# Patient Record
Sex: FEMALE | Race: OTHER | HISPANIC OR LATINO | Employment: UNEMPLOYED | URBAN - METROPOLITAN AREA
[De-identification: names, ages, dates, MRNs, and addresses within clinical notes are randomized per-mention and may not be internally consistent; named-entity substitution may affect disease eponyms.]

---

## 2018-08-15 ENCOUNTER — TELEPHONE (OUTPATIENT)
Dept: SURGERY | Facility: HOSPITAL | Age: 38
End: 2018-08-15

## 2018-08-15 RX ORDER — SODIUM CHLORIDE 9 MG/ML
50 INJECTION, SOLUTION INTRAVENOUS CONTINUOUS
Status: CANCELLED | OUTPATIENT
Start: 2018-08-15

## 2018-08-16 ENCOUNTER — HOSPITAL ENCOUNTER (OUTPATIENT)
Dept: INTERVENTIONAL RADIOLOGY/VASCULAR | Facility: HOSPITAL | Age: 38
Discharge: HOME | End: 2018-08-16
Admitting: PHYSICAL MEDICINE & REHABILITATION
Payer: COMMERCIAL

## 2018-08-16 VITALS
DIASTOLIC BLOOD PRESSURE: 62 MMHG | TEMPERATURE: 97.1 F | WEIGHT: 276 LBS | HEART RATE: 80 BPM | BODY MASS INDEX: 41.83 KG/M2 | OXYGEN SATURATION: 97 % | RESPIRATION RATE: 19 BRPM | SYSTOLIC BLOOD PRESSURE: 105 MMHG | HEIGHT: 68 IN

## 2018-08-16 DIAGNOSIS — M54.16 LUMBAR RADICULOPATHY: ICD-10-CM

## 2018-08-16 PROCEDURE — 99152 MOD SED SAME PHYS/QHP 5/>YRS: CPT

## 2018-08-16 PROCEDURE — 99153 MOD SED SAME PHYS/QHP EA: CPT

## 2018-08-16 RX ORDER — PAPAVERINE HCL 150 MG
CAPSULE, EXTENDED RELEASE ORAL CODE/TRAUMA/SEDATION MEDICATION
Status: COMPLETED | OUTPATIENT
Start: 2018-08-16 | End: 2018-08-16

## 2018-08-16 RX ORDER — TRAZODONE HYDROCHLORIDE 100 MG/1
TABLET ORAL
COMMUNITY

## 2018-08-16 RX ORDER — LIDOCAINE HYDROCHLORIDE 10 MG/ML
INJECTION, SOLUTION EPIDURAL; INFILTRATION; INTRACAUDAL; PERINEURAL CODE/TRAUMA/SEDATION MEDICATION
Status: COMPLETED | OUTPATIENT
Start: 2018-08-16 | End: 2018-08-16

## 2018-08-16 RX ORDER — ZOLPIDEM TARTRATE 12.5 MG/1
TABLET, FILM COATED, EXTENDED RELEASE ORAL
COMMUNITY

## 2018-08-16 RX ORDER — FENTANYL CITRATE 50 UG/ML
INJECTION, SOLUTION INTRAMUSCULAR; INTRAVENOUS CODE/TRAUMA/SEDATION MEDICATION
Status: COMPLETED | OUTPATIENT
Start: 2018-08-16 | End: 2018-08-16

## 2018-08-16 RX ORDER — LISINOPRIL 10 MG/1
TABLET ORAL DAILY
COMMUNITY

## 2018-08-16 RX ORDER — OMEPRAZOLE 20 MG/1
CAPSULE, DELAYED RELEASE ORAL DAILY
COMMUNITY

## 2018-08-16 RX ORDER — INSULIN GLARGINE 100 [IU]/ML
INJECTION, SOLUTION SUBCUTANEOUS
COMMUNITY

## 2018-08-16 RX ORDER — SODIUM CHLORIDE 9 MG/ML
50 INJECTION, SOLUTION INTRAVENOUS CONTINUOUS
Status: DISCONTINUED | OUTPATIENT
Start: 2018-08-16 | End: 2018-08-20 | Stop reason: HOSPADM

## 2018-08-16 RX ORDER — ALPRAZOLAM 0.5 MG/1
TABLET ORAL
COMMUNITY

## 2018-08-16 RX ORDER — KETOROLAC TROMETHAMINE 30 MG/ML
INJECTION, SOLUTION INTRAMUSCULAR; INTRAVENOUS CODE/TRAUMA/SEDATION MEDICATION
Status: COMPLETED | OUTPATIENT
Start: 2018-08-16 | End: 2018-08-16

## 2018-08-16 RX ORDER — SIMVASTATIN 10 MG
TABLET ORAL
COMMUNITY

## 2018-08-16 RX ORDER — FERROUS SULFATE 325(65) MG
TABLET ORAL
COMMUNITY

## 2018-08-16 RX ORDER — MIDAZOLAM HYDROCHLORIDE 1 MG/ML
INJECTION INTRAMUSCULAR; INTRAVENOUS CODE/TRAUMA/SEDATION MEDICATION
Status: COMPLETED | OUTPATIENT
Start: 2018-08-16 | End: 2018-08-16

## 2018-08-16 RX ORDER — ALBUTEROL SULFATE 90 UG/1
2 AEROSOL, METERED RESPIRATORY (INHALATION) EVERY 6 HOURS PRN
COMMUNITY

## 2018-08-16 RX ORDER — OXYCODONE HYDROCHLORIDE AND ACETAMINOPHEN 5; 325 MG/1; MG/1
1 TABLET ORAL EVERY 4 HOURS PRN
COMMUNITY

## 2018-08-16 RX ADMIN — DEXAMETHASONE SODIUM PHOSPHATE 10 MG: 10 INJECTION, SOLUTION INTRAMUSCULAR; INTRAVENOUS at 10:57

## 2018-08-16 RX ADMIN — MIDAZOLAM HYDROCHLORIDE 2 MG: 1 INJECTION, SOLUTION INTRAMUSCULAR; INTRAVENOUS at 10:53

## 2018-08-16 RX ADMIN — LIDOCAINE HYDROCHLORIDE 5 ML: 10 INJECTION, SOLUTION EPIDURAL; INFILTRATION; INTRACAUDAL; PERINEURAL at 10:56

## 2018-08-16 RX ADMIN — FENTANYL CITRATE 25 MCG: 50 INJECTION INTRAMUSCULAR; INTRAVENOUS at 10:59

## 2018-08-16 RX ADMIN — IOHEXOL 3 ML: 300 INJECTION, SOLUTION INTRAVENOUS at 10:57

## 2018-08-16 RX ADMIN — SODIUM CHLORIDE 50 ML/HR: 9 INJECTION, SOLUTION INTRAVENOUS at 10:13

## 2018-08-16 RX ADMIN — SODIUM BICARBONATE 1.5 MEQ: 84 INJECTION PARENTERAL at 10:57

## 2018-08-16 RX ADMIN — FENTANYL CITRATE 50 MCG: 50 INJECTION INTRAMUSCULAR; INTRAVENOUS at 10:53

## 2018-08-16 RX ADMIN — KETOROLAC TROMETHAMINE 30 MG: 30 INJECTION, SOLUTION INTRAMUSCULAR; INTRAVENOUS at 10:53

## 2018-08-16 RX ADMIN — MIDAZOLAM HYDROCHLORIDE 2 MG: 1 INJECTION, SOLUTION INTRAMUSCULAR; INTRAVENOUS at 10:58

## 2018-08-16 NOTE — H&P
History of Present Illness: The patient is a 40 y o  female who presents with complaints of  Right lower limb pain, lumbar radiculopathy, bulging disc  There is no problem list on file for this patient        Past Medical History:   Diagnosis Date    Anemia     Anxiety     Asthma     Depression     Diabetes mellitus ([facility] [address] )     Hyperlipidemia     Hypertension        Past Surgical History:   Procedure Laterality Date    HYSTERECTOMY           Current Outpatient Prescriptions:     albuterol (PROVENTIL HFA,VENTOLIN HFA) 90 mcg/act inhaler, Inhale 2 puffs every 6 (six) hours as needed for wheezing, Disp: , Rfl:     ALPRAZolam (XANAX) 0 5 mg tablet, Take by mouth daily at bedtime as needed for anxiety, Disp: , Rfl:     ferrous sulfate 325 (65 Fe) mg tablet, Take by mouth daily with breakfast, Disp: , Rfl:     Fluticasone Furoate-Vilanterol (BREO ELLIPTA IN), Inhale, Disp: , Rfl:     insulin glargine (LANTUS) 100 units/mL subcutaneous injection, Inject under the skin daily at bedtime, Disp: , Rfl:     insulin lispro (HumaLOG) 100 units/mL injection, Inject under the skin 3 (three) times a day before meals, Disp: , Rfl:     lisinopril (ZESTRIL) 10 mg tablet, Take by mouth daily, Disp: , Rfl:     metFORMIN (GLUCOPHAGE) 1000 MG tablet, Take by mouth daily, Disp: , Rfl:     omeprazole (PriLOSEC) 20 mg delayed release capsule, Take by mouth daily, Disp: , Rfl:     oxyCODONE-acetaminophen (PERCOCET) 5-325 mg per tablet, Take 1 tablet by mouth every 4 (four) hours as needed for moderate pain, Disp: , Rfl:     simvastatin (ZOCOR) 10 mg tablet, Take by mouth daily at bedtime, Disp: , Rfl:     sitaGLIPtin (JANUVIA) 100 mg tablet, Take by mouth daily, Disp: , Rfl:     traZODone (DESYREL) 100 mg tablet, Take by mouth daily at bedtime, Disp: , Rfl:     zolpidem (AMBIEN CR) 12 5 MG CR tablet, Take by mouth daily at bedtime as needed for sleep, Disp: , Rfl:     Current Facility-Administered Medications:     sodium chloride 0 9 % infusion, 50 mL/hr, Intravenous, Continuous, Alan Jerome MD, Stopped at 08/16/18 1152    Allergies   Allergen Reactions    Penicillins Rash       Physical Exam:   Vitals:    08/16/18 1139   BP: 105/62   Pulse: 80   Resp: 19   Temp:    SpO2: 97%     General: Awake, Alert, Oriented x 3, Mood and affect appropriate  Respiratory: Respirations even and unlabored  Cardiovascular: Peripheral pulses intact; no edema  Musculoskeletal Exam:   Positive straight leg raise right side, limited lumbar range of motion, tender posterior elements        Assessment:   1   Lumbar radiculopathy        Plan:  Right L5 and S1 transforaminal epidural steroid injections

## 2018-08-16 NOTE — DISCHARGE INSTRUCTIONS
Epidural Steroid Injection   AMBULATORY CARE:   What you need to know about an epidural steroid injection (LISA):  An LISA is a procedure to inject steroid medicine into the epidural space  The epidural space is between your spinal cord and vertebrae  Steroids reduce inflammation and fluid buildup in your spine that may be causing pain  You may be given pain medicine along with the steroids  How to prepare for an LISA:  Your healthcare provider will talk to you about how to prepare for your procedure  He will tell you what medicines to take or not take on the day of your procedure  You may need to stop taking blood thinners or other medicines several days before your procedure  You may need to adjust any diabetes medicine you take on the day of your procedure  Steroid medicine can increase your blood sugar level  What will happen during an LISA:   · You will be given medicine to numb the procedure area  You will be awake for the procedure, but you will not feel pain  You may also be given medicine to help you relax during the procedure  Contrast liquid will be used to help your healthcare provider see the area better  Tell the healthcare provider if you have ever had an allergic reaction to contrast liquid  · Your healthcare provider may place the needle into your neck area, middle of your back, or tailbone area  He may inject the medicine next to the nerves that are causing your pain  He may instead inject the medicine into a larger area of the epidural space  This helps the medicine spread to more nerves  Your healthcare provider will use a fluoroscope to help guide the needle to the right place  A fluoroscope is a type of x-ray  After the procedure, a bandage will be placed over the injection site to prevent infection  Risks of an LISA:  You may have temporary or permanent nerve damage or paralysis   You may have bleeding or develop a serious infection, such as meningitis (swelling of the brain coverings)  An abscess may also develop  You may need surgery to fix the abscess  You may have a seizure, anxiety, or trouble sleeping  If you are a man, you may have temporary erectile dysfunction (not able to have an erection)  Care for your wound as directed: You may remove the bandage before you go to bed the day of your procedure  You may take a shower, but do not take a bath for at least 24 hours  Self-care:   · Do not drive,  use machines, or do strenuous activity for 24 hours after your procedure or as directed  · Continue other treatments  as directed  Steroid injections alone will not control your pain  The injections are meant to be used with other treatments, such as physical therapy  Seek care immediately if:   · Blood soaks through your bandage  · Your wound is red, swollen, or draining pus  · You have a fever or chills, severe back pain, and the procedure area is sensitive to the touch  · You have a seizure  · You have trouble moving your legs  · You have weakness or numbness in your legs  · You cannot control when you urinate or have a bowel movement  Contact your healthcare provider if:   · You have nausea or are vomiting  · Your face or neck is red for a few days and you feel warm  · You have more pain than you had before the procedure  · You have swelling in your hands or feet  · You have questions or concerns about your condition or care  Follow up with your healthcare provider as directed:  Write down your questions so you remember to ask them during your visits  © 2017 [address]  Information is for End User's use only and may not be sold, redistributed or otherwise used for commercial purposes  All illustrations and images included in CareNotes® are the copyrighted property of [employer]  or [employer]  The above information is an  only   It is not intended as medical advice for individual conditions or treatments  Talk to your doctor, nurse or pharmacist before following any medical regimen to see if it is safe and effective for you

## 2018-08-16 NOTE — NURSING NOTE
AVS was given earlier by RN  Understands instructions  Prescription for Percocet given  Declined wheelchair to lobby  Ambulated with family member to lobby  Follow up with MD in office

## 2018-09-05 ENCOUNTER — TELEPHONE (OUTPATIENT)
Dept: SURGERY | Facility: HOSPITAL | Age: 38
End: 2018-09-05

## 2018-09-05 RX ORDER — SODIUM CHLORIDE 9 MG/ML
50 INJECTION, SOLUTION INTRAVENOUS CONTINUOUS
Status: CANCELLED | OUTPATIENT
Start: 2018-09-05

## 2018-09-06 ENCOUNTER — HOSPITAL ENCOUNTER (OUTPATIENT)
Dept: INTERVENTIONAL RADIOLOGY/VASCULAR | Facility: HOSPITAL | Age: 38
Discharge: HOME | End: 2018-09-06
Admitting: PHYSICAL MEDICINE & REHABILITATION
Payer: COMMERCIAL

## 2018-09-06 VITALS
DIASTOLIC BLOOD PRESSURE: 56 MMHG | OXYGEN SATURATION: 99 % | TEMPERATURE: 98.1 F | HEART RATE: 66 BPM | SYSTOLIC BLOOD PRESSURE: 114 MMHG | RESPIRATION RATE: 18 BRPM

## 2018-09-06 DIAGNOSIS — M54.16 LUMBAR RADICULOPATHY: ICD-10-CM

## 2018-09-06 PROCEDURE — 99152 MOD SED SAME PHYS/QHP 5/>YRS: CPT

## 2018-09-06 RX ORDER — MIDAZOLAM HYDROCHLORIDE 1 MG/ML
INJECTION INTRAMUSCULAR; INTRAVENOUS CODE/TRAUMA/SEDATION MEDICATION
Status: COMPLETED | OUTPATIENT
Start: 2018-09-06 | End: 2018-09-06

## 2018-09-06 RX ORDER — LIDOCAINE HYDROCHLORIDE 10 MG/ML
INJECTION, SOLUTION EPIDURAL; INFILTRATION; INTRACAUDAL; PERINEURAL CODE/TRAUMA/SEDATION MEDICATION
Status: COMPLETED | OUTPATIENT
Start: 2018-09-06 | End: 2018-09-06

## 2018-09-06 RX ORDER — FENTANYL CITRATE 50 UG/ML
INJECTION, SOLUTION INTRAMUSCULAR; INTRAVENOUS CODE/TRAUMA/SEDATION MEDICATION
Status: COMPLETED | OUTPATIENT
Start: 2018-09-06 | End: 2018-09-06

## 2018-09-06 RX ORDER — SODIUM CHLORIDE 9 MG/ML
50 INJECTION, SOLUTION INTRAVENOUS CONTINUOUS
Status: DISCONTINUED | OUTPATIENT
Start: 2018-09-06 | End: 2018-09-10 | Stop reason: HOSPADM

## 2018-09-06 RX ORDER — KETOROLAC TROMETHAMINE 30 MG/ML
INJECTION, SOLUTION INTRAMUSCULAR; INTRAVENOUS CODE/TRAUMA/SEDATION MEDICATION
Status: COMPLETED | OUTPATIENT
Start: 2018-09-06 | End: 2018-09-06

## 2018-09-06 RX ADMIN — MIDAZOLAM HYDROCHLORIDE 2 MG: 1 INJECTION, SOLUTION INTRAMUSCULAR; INTRAVENOUS at 11:25

## 2018-09-06 RX ADMIN — KETOROLAC TROMETHAMINE 30 MG: 30 INJECTION, SOLUTION INTRAMUSCULAR; INTRAVENOUS at 11:24

## 2018-09-06 RX ADMIN — LIDOCAINE HYDROCHLORIDE 6 ML: 10 INJECTION, SOLUTION EPIDURAL; INFILTRATION; INTRACAUDAL; PERINEURAL at 11:30

## 2018-09-06 RX ADMIN — SODIUM CHLORIDE 50 ML/HR: 0.9 INJECTION, SOLUTION INTRAVENOUS at 09:51

## 2018-09-06 RX ADMIN — IOHEXOL 3 ML: 300 INJECTION, SOLUTION INTRAVENOUS at 11:30

## 2018-09-06 RX ADMIN — FENTANYL CITRATE 50 MCG: 50 INJECTION INTRAMUSCULAR; INTRAVENOUS at 11:25

## 2018-09-06 RX ADMIN — SODIUM BICARBONATE 25 MEQ: 84 INJECTION PARENTERAL at 11:30

## 2018-09-06 NOTE — DISCHARGE INSTRUCTIONS
Epidural Steroid Injection   AMBULATORY CARE:   What you need to know about an epidural steroid injection (LISA):  An LISA is a procedure to inject steroid medicine into the epidural space  The epidural space is between your spinal cord and vertebrae  Steroids reduce inflammation and fluid buildup in your spine that may be causing pain  You may be given pain medicine along with the steroids  How to prepare for an LISA:  Your healthcare provider will talk to you about how to prepare for your procedure  He will tell you what medicines to take or not take on the day of your procedure  You may need to stop taking blood thinners or other medicines several days before your procedure  You may need to adjust any diabetes medicine you take on the day of your procedure  Steroid medicine can increase your blood sugar level  What will happen during an LISA:   · You will be given medicine to numb the procedure area  You will be awake for the procedure, but you will not feel pain  You may also be given medicine to help you relax during the procedure  Contrast liquid will be used to help your healthcare provider see the area better  Tell the healthcare provider if you have ever had an allergic reaction to contrast liquid  · Your healthcare provider may place the needle into your neck area, middle of your back, or tailbone area  He may inject the medicine next to the nerves that are causing your pain  He may instead inject the medicine into a larger area of the epidural space  This helps the medicine spread to more nerves  Your healthcare provider will use a fluoroscope to help guide the needle to the right place  A fluoroscope is a type of x-ray  After the procedure, a bandage will be placed over the injection site to prevent infection  Risks of an LISA:  You may have temporary or permanent nerve damage or paralysis   You may have bleeding or develop a serious infection, such as meningitis (swelling of the brain coverings)  An abscess may also develop  You may need surgery to fix the abscess  You may have a seizure, anxiety, or trouble sleeping  If you are a man, you may have temporary erectile dysfunction (not able to have an erection)  Care for your wound as directed: You may remove the bandage before you go to bed the day of your procedure  You may take a shower, but do not take a bath for at least 24 hours  Self-care:   · Do not drive,  use machines, or do strenuous activity for 24 hours after your procedure or as directed  · Continue other treatments  as directed  Steroid injections alone will not control your pain  The injections are meant to be used with other treatments, such as physical therapy  Seek care immediately if:   · Blood soaks through your bandage  · Your wound is red, swollen, or draining pus  · You have a fever or chills, severe back pain, and the procedure area is sensitive to the touch  · You have a seizure  · You have trouble moving your legs  · You have weakness or numbness in your legs  · You cannot control when you urinate or have a bowel movement  Contact your healthcare provider if:   · You have nausea or are vomiting  · Your face or neck is red for a few days and you feel warm  · You have more pain than you had before the procedure  · You have swelling in your hands or feet  · You have questions or concerns about your condition or care  Follow up with your healthcare provider as directed:  Write down your questions so you remember to ask them during your visits  © 2017 [address]  Information is for End User's use only and may not be sold, redistributed or otherwise used for commercial purposes  All illustrations and images included in CareNotes® are the copyrighted property of [employer]  or Wilber Hyde  The above information is an  only   It is not intended as medical advice for individual conditions or treatments  Talk to your doctor, nurse or pharmacist before following any medical regimen to see if it is safe and effective for you

## 2018-09-06 NOTE — NURSING NOTE
Returned from cath lab  Alert and oriented  Pain 7/10 in lower back after procedure  Band-aids (2) dry and intact  Moves all extremities  HOB elevated  Respirations easy and non labored  IV fluids continue  Call bell in reach

## 2018-09-06 NOTE — H&P
History of Present Illness: The patient is a 41 y o  female who presents with complaints of right lumbar radiculopathy, lumbar bulging disc    There is no problem list on file for this patient        Past Medical History:   Diagnosis Date    Anemia     Anxiety     Asthma     Depression     Diabetes mellitus ([facility] [address] )     Hyperlipidemia     Hypertension        Past Surgical History:   Procedure Laterality Date    HYSTERECTOMY           Current Outpatient Prescriptions:     albuterol (PROVENTIL HFA,VENTOLIN HFA) 90 mcg/act inhaler, Inhale 2 puffs every 6 (six) hours as needed for wheezing, Disp: , Rfl:     ALPRAZolam (XANAX) 0 5 mg tablet, Take by mouth daily at bedtime as needed for anxiety, Disp: , Rfl:     ferrous sulfate 325 (65 Fe) mg tablet, Take by mouth daily with breakfast, Disp: , Rfl:     Fluticasone Furoate-Vilanterol (BREO ELLIPTA IN), Inhale, Disp: , Rfl:     insulin glargine (LANTUS) 100 units/mL subcutaneous injection, Inject under the skin daily at bedtime, Disp: , Rfl:     insulin lispro (HumaLOG) 100 units/mL injection, Inject under the skin 3 (three) times a day before meals, Disp: , Rfl:     lisinopril (ZESTRIL) 10 mg tablet, Take by mouth daily, Disp: , Rfl:     metFORMIN (GLUCOPHAGE) 1000 MG tablet, Take by mouth daily, Disp: , Rfl:     omeprazole (PriLOSEC) 20 mg delayed release capsule, Take by mouth daily, Disp: , Rfl:     oxyCODONE-acetaminophen (PERCOCET) 5-325 mg per tablet, Take 1 tablet by mouth every 4 (four) hours as needed for moderate pain, Disp: , Rfl:     simvastatin (ZOCOR) 10 mg tablet, Take by mouth daily at bedtime, Disp: , Rfl:     sitaGLIPtin (JANUVIA) 100 mg tablet, Take by mouth daily, Disp: , Rfl:     traZODone (DESYREL) 100 mg tablet, Take by mouth daily at bedtime, Disp: , Rfl:     zolpidem (AMBIEN CR) 12 5 MG CR tablet, Take by mouth daily at bedtime as needed for sleep, Disp: , Rfl:     Current Facility-Administered Medications:     sodium chloride 0 9 % infusion, 50 mL/hr, Intravenous, Continuous, Alan Jerome MD, Last Rate: 50 mL/hr at 09/06/18 0951, 50 mL/hr at 09/06/18 0951    Allergies   Allergen Reactions    Penicillins Rash       Physical Exam:   Vitals:    09/06/18 0908   BP: 116/58   Pulse: 80   Resp: 20   Temp: 98 °F (36 7 °C)   SpO2: 98%     General: Awake, Alert, Oriented x 3, Mood and affect appropriate  Respiratory: Respirations even and unlabored  Cardiovascular: Peripheral pulses intact; no edema  Musculoskeletal Exam:   Positive straight leg raise right side only limited lumbar range of motion        Assessment:   1   Lumbar radiculopathy        Plan:   Right L5 and S1 transforaminal epidural steroid injections

## 2018-10-24 ENCOUNTER — TELEPHONE (OUTPATIENT)
Dept: SURGERY | Facility: HOSPITAL | Age: 38
End: 2018-10-24

## 2018-11-07 ENCOUNTER — TELEPHONE (OUTPATIENT)
Dept: SURGERY | Facility: HOSPITAL | Age: 38
End: 2018-11-07

## 2018-11-08 RX ORDER — SODIUM CHLORIDE 9 MG/ML
50 INJECTION, SOLUTION INTRAVENOUS CONTINUOUS
Status: CANCELLED | OUTPATIENT
Start: 2018-11-08

## 2024-02-08 ENCOUNTER — HOSPITAL ENCOUNTER (EMERGENCY)
Facility: HOSPITAL | Age: 44
Discharge: HOME | End: 2024-02-08
Attending: EMERGENCY MEDICINE | Admitting: EMERGENCY MEDICINE
Payer: COMMERCIAL

## 2024-02-08 VITALS
WEIGHT: 180 LBS | BODY MASS INDEX: 27.37 KG/M2 | SYSTOLIC BLOOD PRESSURE: 127 MMHG | DIASTOLIC BLOOD PRESSURE: 82 MMHG | TEMPERATURE: 98.6 F | OXYGEN SATURATION: 100 % | HEART RATE: 85 BPM | RESPIRATION RATE: 16 BRPM

## 2024-02-08 DIAGNOSIS — K08.89 DENTALGIA: Primary | ICD-10-CM

## 2024-02-08 PROCEDURE — 99284 EMERGENCY DEPT VISIT MOD MDM: CPT | Performed by: EMERGENCY MEDICINE

## 2024-02-08 PROCEDURE — 99282 EMERGENCY DEPT VISIT SF MDM: CPT

## 2024-02-08 RX ORDER — LISINOPRIL AND HYDROCHLOROTHIAZIDE 25; 20 MG/1; MG/1
TABLET ORAL
COMMUNITY

## 2024-02-08 RX ORDER — METRONIDAZOLE 500 MG/1
TABLET ORAL
COMMUNITY

## 2024-02-08 RX ORDER — MEDROXYPROGESTERONE ACETATE 10 MG/1
TABLET ORAL
COMMUNITY

## 2024-02-08 RX ORDER — TRAMADOL HYDROCHLORIDE 50 MG/1
TABLET ORAL
COMMUNITY
End: 2024-02-08 | Stop reason: ALTCHOICE

## 2024-02-08 RX ORDER — TRAMADOL HYDROCHLORIDE 50 MG/1
50 TABLET ORAL EVERY 6 HOURS PRN
Qty: 16 TABLET | Refills: 0 | Status: SHIPPED | OUTPATIENT
Start: 2024-02-08 | End: 2024-02-18

## 2024-02-10 NOTE — ED PROVIDER NOTES
History  Chief Complaint   Patient presents with    Dental Pain     Patient reports dental pain, molars need to be removed. Unrelieved pain with tylenol.      Patient is a 43-year-old female who presents with an exacerbation of chronic dental pain.  Has seen a dentist, told she needs an oral surgeon.  No fever.  No difficulty swallowing or breathing.  No relief with otc meds.  No fever. Pain is b/l sides of mouth, upper and lower.  No radiation.         Prior to Admission Medications   Prescriptions Last Dose Informant Patient Reported? Taking?   ALPRAZolam (XANAX) 0.5 mg tablet  Self Yes No   Sig: Take by mouth daily at bedtime as needed for anxiety   Fluticasone Furoate-Vilanterol (BREO ELLIPTA IN)  Self Yes No   Sig: Inhale   albuterol (PROVENTIL HFA,VENTOLIN HFA) 90 mcg/act inhaler  Self Yes No   Sig: Inhale 2 puffs every 6 (six) hours as needed for wheezing   ferrous sulfate 325 (65 Fe) mg tablet  Self Yes No   Sig: Take by mouth daily with breakfast   insulin glargine (LANTUS) 100 units/mL subcutaneous injection  Self Yes No   Sig: Inject under the skin daily at bedtime   insulin lispro (HumaLOG) 100 units/mL injection  Self Yes No   Sig: Inject under the skin 3 (three) times a day before meals   lisinopril (ZESTRIL) 10 mg tablet  Self Yes No   Sig: Take by mouth daily   lisinopril-hydrochlorothiazide (PRINZIDE,ZESTORETIC) 20-25 MG per tablet   Yes No   medroxyPROGESTERone (PROVERA) 10 mg tablet   Yes No   metFORMIN (GLUCOPHAGE) 1000 MG tablet  Self Yes No   Sig: Take by mouth daily   metroNIDAZOLE (FLAGYL) 500 mg tablet   Yes No   nystatin-triamcinolone (MYCOLOG-II) cream   Yes No   omeprazole (PriLOSEC) 20 mg delayed release capsule  Self Yes No   Sig: Take by mouth daily   simvastatin (ZOCOR) 10 mg tablet   Yes No   Sig: Take by mouth daily at bedtime   sitaGLIPtin (JANUVIA) 100 mg tablet  Self Yes No   Sig: Take by mouth daily   traZODone (DESYREL) 100 mg tablet  Self Yes No   Sig: Take by mouth daily at  bedtime   zolpidem (AMBIEN CR) 12.5 MG CR tablet  Self Yes No   Sig: Take by mouth daily at bedtime as needed for sleep      Facility-Administered Medications: None       Past Medical History:   Diagnosis Date    Anemia     Anxiety     Asthma     Depression     Diabetes mellitus (HCC)     Hyperlipidemia     Hypertension        Past Surgical History:   Procedure Laterality Date    HYSTERECTOMY         Family History   Problem Relation Age of Onset    Hypertension Mother     Asthma Mother     Diabetes Father     Asthma Father      I have reviewed and agree with the history as documented.    E-Cigarette/Vaping    E-Cigarette Use Never User      E-Cigarette/Vaping Substances     Social History     Tobacco Use    Smoking status: Never    Smokeless tobacco: Never   Vaping Use    Vaping status: Never Used   Substance Use Topics    Alcohol use: No    Drug use: No       Review of Systems   Constitutional: Negative.    HENT:  Positive for dental problem.    Eyes: Negative.    Respiratory: Negative.     Cardiovascular: Negative.    Gastrointestinal: Negative.    Endocrine: Negative.    Genitourinary: Negative.    Musculoskeletal: Negative.    Skin: Negative.    Allergic/Immunologic: Negative.    Neurological: Negative.    Hematological: Negative.    Psychiatric/Behavioral: Negative.     All other systems reviewed and are negative.      Physical Exam  Physical Exam  Vitals and nursing note reviewed.   Constitutional:       Appearance: Normal appearance. She is normal weight.   HENT:      Head: Normocephalic and atraumatic.      Right Ear: Tympanic membrane, ear canal and external ear normal.      Left Ear: Tympanic membrane, ear canal and external ear normal.      Nose: Nose normal.      Mouth/Throat:      Mouth: Mucous membranes are moist.      Pharynx: Oropharynx is clear.      Comments: Multiple missing teeth, more on top row than bottom.  Remaining teeth in various stages of decay.    No abscess, no trismus, no fluctuance.   No tongue protrusion or brawny discoloration under the chin.  Cardiovascular:      Rate and Rhythm: Normal rate and regular rhythm.      Pulses: Normal pulses.      Heart sounds: Normal heart sounds.   Pulmonary:      Effort: Pulmonary effort is normal.      Breath sounds: Normal breath sounds.   Musculoskeletal:      Cervical back: Normal range of motion.   Skin:     General: Skin is warm and dry.   Neurological:      General: No focal deficit present.      Mental Status: She is alert and oriented to person, place, and time.         Vital Signs  ED Triage Vitals [02/08/24 1629]   Temperature Pulse Respirations Blood Pressure SpO2   98.6 °F (37 °C) 85 16 127/82 100 %      Temp Source Heart Rate Source Patient Position - Orthostatic VS BP Location FiO2 (%)   Tympanic Monitor Sitting Left arm --      Pain Score       --           Vitals:    02/08/24 1629   BP: 127/82   Pulse: 85   Patient Position - Orthostatic VS: Sitting         Visual Acuity      ED Medications  Medications - No data to display    Diagnostic Studies  Results Reviewed       None                   No orders to display              Procedures  Procedures         ED Course                               SBIRT 22yo+      Flowsheet Row Most Recent Value   Initial Alcohol Screen: US AUDIT-C     1. How often do you have a drink containing alcohol? 0 Filed at: 02/08/2024 1631   2. How many drinks containing alcohol do you have on a typical day you are drinking?  0 Filed at: 02/08/2024 1631   3a. Male UNDER 65: How often do you have five or more drinks on one occasion? 0 Filed at: 02/08/2024 1631   3b. FEMALE Any Age, or MALE 65+: How often do you have 4 or more drinks on one occassion? 0 Filed at: 02/08/2024 1631   Audit-C Score 0 Filed at: 02/08/2024 1631   PAMELA: How many times in the past year have you...    Used an illegal drug or used a prescription medication for non-medical reasons? Never Filed at: 02/08/2024 1631                      Medical Decision  Making  Problems Addressed:  Dentalgia: chronic illness or injury     Details: No airway issues.  No acute infection.  Needs oral surgeon.     Risk  Prescription drug management.             Disposition  Final diagnoses:   Dentalgia     Time reflects when diagnosis was documented in both MDM as applicable and the Disposition within this note       Time User Action Codes Description Comment    2/8/2024  4:33 PM Naeem Aviles Add [K08.89] Dentalgia           ED Disposition       ED Disposition   Discharge    Condition   Stable    Date/Time   Thu Feb 8, 2024 1633    Comment   Pema Kraus discharge to home/self care.                   Follow-up Information       Follow up With Specialties Details Why Contact Info    [facility] [place] [address]  [phone]            Discharge Medication List as of 2/8/2024  4:35 PM        START taking these medications    Details   traMADol (Ultram) 50 mg tablet Take 1 tablet (50 mg total) by mouth every 6 (six) hours as needed for moderate pain for up to 10 days, Starting Thu 2/8/2024, Until Sun 2/18/2024 at 2359, Normal           CONTINUE these medications which have NOT CHANGED    Details   albuterol (PROVENTIL HFA,VENTOLIN HFA) 90 mcg/act inhaler Inhale 2 puffs every 6 (six) hours as needed for wheezing, Historical Med      ALPRAZolam (XANAX) 0.5 mg tablet Take by mouth daily at bedtime as needed for anxiety, Historical Med      ferrous sulfate 325 (65 Fe) mg tablet Take by mouth daily with breakfast, Historical Med      Fluticasone Furoate-Vilanterol (BREO ELLIPTA IN) Inhale, Historical Med      insulin glargine (LANTUS) 100 units/mL subcutaneous injection Inject under the skin daily at bedtime, Historical Med      insulin lispro (HumaLOG) 100 units/mL injection Inject under the skin 3 (three) times a day before meals, Historical Med      lisinopril (ZESTRIL) 10 mg tablet Take by mouth  daily, Historical Med      lisinopril-hydrochlorothiazide (PRINZIDE,ZESTORETIC) 20-25 MG per tablet Historical Med      medroxyPROGESTERone (PROVERA) 10 mg tablet Historical Med      metFORMIN (GLUCOPHAGE) 1000 MG tablet Take by mouth daily, Historical Med      metroNIDAZOLE (FLAGYL) 500 mg tablet Historical Med      nystatin-triamcinolone (MYCOLOG-II) cream Historical Med      omeprazole (PriLOSEC) 20 mg delayed release capsule Take by mouth daily, Historical Med      simvastatin (ZOCOR) 10 mg tablet Take by mouth daily at bedtime, Historical Med      sitaGLIPtin (JANUVIA) 100 mg tablet Take by mouth daily, Historical Med      traZODone (DESYREL) 100 mg tablet Take by mouth daily at bedtime, Historical Med      zolpidem (AMBIEN CR) 12.5 MG CR tablet Take by mouth daily at bedtime as needed for sleep, Historical Med             No discharge procedures on file.    PDMP Review       None            ED Provider  Electronically Signed by             Naeem Aviles MD  02/10/24 9649

## 2024-04-07 ENCOUNTER — HOSPITAL ENCOUNTER (EMERGENCY)
Facility: HOSPITAL | Age: 44
Discharge: HOME | End: 2024-04-07
Attending: EMERGENCY MEDICINE

## 2024-04-07 VITALS
OXYGEN SATURATION: 99 % | WEIGHT: 173.9 LBS | DIASTOLIC BLOOD PRESSURE: 68 MMHG | HEART RATE: 71 BPM | TEMPERATURE: 98 F | SYSTOLIC BLOOD PRESSURE: 120 MMHG | RESPIRATION RATE: 16 BRPM | BODY MASS INDEX: 26.44 KG/M2

## 2024-04-07 DIAGNOSIS — K08.89 PAIN, DENTAL: Primary | ICD-10-CM

## 2024-04-07 PROCEDURE — 99282 EMERGENCY DEPT VISIT SF MDM: CPT

## 2024-04-07 PROCEDURE — 99283 EMERGENCY DEPT VISIT LOW MDM: CPT

## 2024-04-07 NOTE — ED PROVIDER NOTES
History  Chief Complaint   Patient presents with    Dental Pain     R lower     Patient is a 43-year-old female coming in for left lower dental pain.  Has been ongoing for approximately 2 weeks.  Reports she has a history of gastric bypass, and cannot take Motrin.  States that Tylenol does not work for her.  Patient has not yet called her dentist.      Dental Pain      Prior to Admission Medications   Prescriptions Last Dose Informant Patient Reported? Taking?   ALPRAZolam (XANAX) 0.5 mg tablet  Self Yes No   Sig: Take by mouth daily at bedtime as needed for anxiety   Fluticasone Furoate-Vilanterol (BREO ELLIPTA IN)  Self Yes No   Sig: Inhale   albuterol (PROVENTIL HFA,VENTOLIN HFA) 90 mcg/act inhaler  Self Yes No   Sig: Inhale 2 puffs every 6 (six) hours as needed for wheezing   ferrous sulfate 325 (65 Fe) mg tablet  Self Yes No   Sig: Take by mouth daily with breakfast   insulin glargine (LANTUS) 100 units/mL subcutaneous injection  Self Yes No   Sig: Inject under the skin daily at bedtime   insulin lispro (HumaLOG) 100 units/mL injection  Self Yes No   Sig: Inject under the skin 3 (three) times a day before meals   lisinopril (ZESTRIL) 10 mg tablet  Self Yes No   Sig: Take by mouth daily   lisinopril-hydrochlorothiazide (PRINZIDE,ZESTORETIC) 20-25 MG per tablet   Yes No   medroxyPROGESTERone (PROVERA) 10 mg tablet   Yes No   metFORMIN (GLUCOPHAGE) 1000 MG tablet  Self Yes No   Sig: Take by mouth daily   metroNIDAZOLE (FLAGYL) 500 mg tablet   Yes No   nystatin-triamcinolone (MYCOLOG-II) cream   Yes No   omeprazole (PriLOSEC) 20 mg delayed release capsule  Self Yes No   Sig: Take by mouth daily   simvastatin (ZOCOR) 10 mg tablet   Yes No   Sig: Take by mouth daily at bedtime   sitaGLIPtin (JANUVIA) 100 mg tablet  Self Yes No   Sig: Take by mouth daily   traZODone (DESYREL) 100 mg tablet  Self Yes No   Sig: Take by mouth daily at bedtime   zolpidem (AMBIEN CR) 12.5 MG CR tablet  Self Yes No   Sig: Take by mouth  daily at bedtime as needed for sleep      Facility-Administered Medications: None       Past Medical History:   Diagnosis Date    Anemia     Anxiety     Asthma     Depression     Diabetes mellitus (HCC)     Hyperlipidemia     Hypertension        Past Surgical History:   Procedure Laterality Date    HYSTERECTOMY         Family History   Problem Relation Age of Onset    Hypertension Mother     Asthma Mother     Diabetes Father     Asthma Father      I have reviewed and agree with the history as documented.    E-Cigarette/Vaping    E-Cigarette Use Never User      E-Cigarette/Vaping Substances     Social History     Tobacco Use    Smoking status: Never    Smokeless tobacco: Never   Vaping Use    Vaping status: Never Used   Substance Use Topics    Alcohol use: No    Drug use: No       Review of Systems   HENT:  Positive for dental problem.        Physical Exam  Physical Exam  Vitals reviewed.   Constitutional:       Appearance: Normal appearance. She is normal weight.   HENT:      Head: Normocephalic and atraumatic.      Comments: Patient has poor dentition.  No erythema, swelling, no abscess.  Speaking full sentences, no trismus     Right Ear: External ear normal.      Left Ear: External ear normal.      Nose: Nose normal.   Eyes:      Conjunctiva/sclera: Conjunctivae normal.   Cardiovascular:      Rate and Rhythm: Normal rate.   Pulmonary:      Effort: Pulmonary effort is normal.   Musculoskeletal:         General: Normal range of motion.      Cervical back: Normal range of motion.   Skin:     General: Skin is warm and dry.   Neurological:      Mental Status: She is alert.         Vital Signs  ED Triage Vitals [04/07/24 1743]   Temperature Pulse Respirations Blood Pressure SpO2   98 °F (36.7 °C) 71 16 120/68 99 %      Temp Source Heart Rate Source Patient Position - Orthostatic VS BP Location FiO2 (%)   Tympanic Monitor Sitting Left arm --      Pain Score       --           Vitals:    04/07/24 1743   BP: 120/68    Pulse: 71   Patient Position - Orthostatic VS: Sitting         Visual Acuity      ED Medications  Medications - No data to display    Diagnostic Studies  Results Reviewed       None                   No orders to display              Procedures  Procedures         ED Course                               SBIRT 20yo+      Flowsheet Row Most Recent Value   Initial Alcohol Screen: US AUDIT-C     1. How often do you have a drink containing alcohol? 0 Filed at: 04/07/2024 1743   2. How many drinks containing alcohol do you have on a typical day you are drinking?  0 Filed at: 04/07/2024 1743   3a. Male UNDER 65: How often do you have five or more drinks on one occasion? 0 Filed at: 04/07/2024 1743   3b. FEMALE Any Age, or MALE 65+: How often do you have 4 or more drinks on one occassion? 0 Filed at: 04/07/2024 1743   Audit-C Score 0 Filed at: 04/07/2024 1743   PAMELA: How many times in the past year have you...    Used an illegal drug or used a prescription medication for non-medical reasons? Never Filed at: 04/07/2024 1743                      Medical Decision Making  Patient comes in for dental pain.  Patient has no sign of infection at this time, but does have poor dentition.  Given information for local dental clinic.  Discussed with patient that I would not be prescribing her tramadol as requested, but rather that she needs to go get her teeth repaired by dentistry.  Recommended that she take Tylenol, and not take Motrin due to her gastric bypass history             Disposition  Final diagnoses:   Pain, dental     Time reflects when diagnosis was documented in both MDM as applicable and the Disposition within this note       Time User Action Codes Description Comment    4/7/2024  5:59 PM Chacorta Morrison Add [K08.89] Pain, dental           ED Disposition       ED Disposition   Discharge    Condition   Stable    Date/Time   Sun Apr 7, 2024 2639    Comment   Pema Kraus discharge to home/self care.                    Follow-up Information       Follow up With Specialties Details Why Contact Info Additional Information    Brenton Villa MD Internal Medicine   [address]  [address]  [facility] [address]  [phone]       [facility] Emergency Department Emergency Medicine  As needed, If symptoms worsen [address] [facility] [address] [phone] [facility] Emergency Department    [facility]  Schedule an appointment as soon as possible for a visit in 1 day For dental follow up [address] [facility] [id number] [phone]             Discharge Medication List as of 4/7/2024  5:59 PM        CONTINUE these medications which have NOT CHANGED    Details   albuterol (PROVENTIL HFA,VENTOLIN HFA) 90 mcg/act inhaler Inhale 2 puffs every 6 (six) hours as needed for wheezing, Historical Med      ALPRAZolam (XANAX) 0.5 mg tablet Take by mouth daily at bedtime as needed for anxiety, Historical Med      ferrous sulfate 325 (65 Fe) mg tablet Take by mouth daily with breakfast, Historical Med      Fluticasone Furoate-Vilanterol (BREO ELLIPTA IN) Inhale, Historical Med      insulin glargine (LANTUS) 100 units/mL subcutaneous injection Inject under the skin daily at bedtime, Historical Med      insulin lispro (HumaLOG) 100 units/mL injection Inject under the skin 3 (three) times a day before meals, Historical Med      lisinopril (ZESTRIL) 10 mg tablet Take by mouth daily, Historical Med      lisinopril-hydrochlorothiazide (PRINZIDE,ZESTORETIC) 20-25 MG per tablet Historical Med      medroxyPROGESTERone (PROVERA) 10 mg tablet Historical Med      metFORMIN (GLUCOPHAGE) 1000 MG tablet Take by mouth daily, Historical Med      metroNIDAZOLE (FLAGYL) 500 mg tablet Historical Med      nystatin-triamcinolone (MYCOLOG-II) cream Historical Med      omeprazole (PriLOSEC) 20 mg delayed release capsule Take by mouth daily, Historical Med      simvastatin  (ZOCOR) 10 mg tablet Take by mouth daily at bedtime, Historical Med      sitaGLIPtin (JANUVIA) 100 mg tablet Take by mouth daily, Historical Med      traZODone (DESYREL) 100 mg tablet Take by mouth daily at bedtime, Historical Med      zolpidem (AMBIEN CR) 12.5 MG CR tablet Take by mouth daily at bedtime as needed for sleep, Historical Med             No discharge procedures on file.    PDMP Review       None            ED Provider  Electronically Signed by             Chacorta Morrison PA-C  04/07/24 1917

## 2024-04-07 NOTE — Clinical Note
Pema Kraus was seen and treated in our emergency department on 4/7/2024.    No restrictions            Diagnosis:     Pema  .    She may return on this date: 04/08/2024         If you have any questions or concerns, please don't hesitate to call.      Chacorta Morrison PA-C    ______________________________           _______________          _______________  Hospital Representative                              Date                                Time